# Patient Record
Sex: FEMALE | Race: ASIAN | NOT HISPANIC OR LATINO | URBAN - METROPOLITAN AREA
[De-identification: names, ages, dates, MRNs, and addresses within clinical notes are randomized per-mention and may not be internally consistent; named-entity substitution may affect disease eponyms.]

---

## 2019-08-10 ENCOUNTER — EMERGENCY (EMERGENCY)
Facility: HOSPITAL | Age: 34
LOS: 1 days | Discharge: HOME | End: 2019-08-10
Admitting: EMERGENCY MEDICINE
Payer: COMMERCIAL

## 2019-08-10 VITALS
DIASTOLIC BLOOD PRESSURE: 73 MMHG | TEMPERATURE: 98 F | WEIGHT: 119.93 LBS | OXYGEN SATURATION: 100 % | RESPIRATION RATE: 17 BRPM | HEART RATE: 90 BPM | HEIGHT: 66 IN | SYSTOLIC BLOOD PRESSURE: 106 MMHG

## 2019-08-10 VITALS
DIASTOLIC BLOOD PRESSURE: 72 MMHG | TEMPERATURE: 98 F | HEART RATE: 83 BPM | RESPIRATION RATE: 18 BRPM | SYSTOLIC BLOOD PRESSURE: 105 MMHG | OXYGEN SATURATION: 99 %

## 2019-08-10 PROCEDURE — 99283 EMERGENCY DEPT VISIT LOW MDM: CPT

## 2019-08-10 RX ORDER — ACETAMINOPHEN 500 MG
650 TABLET ORAL ONCE
Refills: 0 | Status: COMPLETED | OUTPATIENT
Start: 2019-08-10 | End: 2019-08-10

## 2019-08-10 RX ADMIN — Medication 650 MILLIGRAM(S): at 02:03

## 2019-08-10 NOTE — ED PROVIDER NOTE - RESPIRATORY [-], MLM
no shortness of breath no shortness of breath/no hemoptysis/no cough/no exertional dyspnea/no orthopnea

## 2019-08-10 NOTE — ED ADULT NURSE NOTE - OBJECTIVE STATEMENT
Pt is a 34y female complaining of head injury s/p syncopal episode yesterday. Pt states that episode occurred when changing positions. Pt states that she hit the back of her head unknown loc. Pt complaining of headache today. Pt denies nausea, vomiting, fever and chills.

## 2019-08-10 NOTE — ED PROVIDER NOTE - MUSCULOSKELETAL, MLM
Spine appears normal, range of motion is not limited, no muscle or joint tenderness; no midline spine tenderness, + contusion to posterior scalp

## 2019-08-10 NOTE — ED PROVIDER NOTE - ENMT, MLM
Airway patent. Airway patent, Nasal mucosa clear. Mouth with normal mucosa. Throat has no vesicles, no oropharyngeal exudates and uvula is midline. No hemotympanum

## 2019-08-10 NOTE — ED PROVIDER NOTE - NEUROLOGICAL, MLM
Alert and oriented, no focal deficits, no motor or sensory deficits. CNs intact. Normal Romberg. Normal finger to nose. No pronator drift. Normal rapi alternating movements/heal to shin. Sensation intact to all extremities. 5/5 strength to all extremities.

## 2019-08-10 NOTE — ED PROVIDER NOTE - CLINICAL SUMMARY MEDICAL DECISION MAKING FREE TEXT BOX
35 y/o Female presents to ED s/p head injury from Thursday night and worsening HA. Will give pt pain relievers. 35 y/o Female presents to ED s/p head injury from Thursday night with headache that is nonprogressive or severe.  pt has not taken anything for pain.  Nonfocal exam and pt well appearing.  Pt with head injury >24 hours ago with nonfocal exam and mild headache, no suspicion at this time for intracranial bleeding or fracture.  Shared decision making used with pt and will not obtain CT scan. pt likely with mild concussion. advised neuro f/u.  tylenol, motrin, Ice for pain

## 2019-08-10 NOTE — ED PROVIDER NOTE - NSFOLLOWUPCLINICS_GEN_ALL_ED_FT
[facility]  Neurology  [address] [place] [address]  Phone: [phone]  Fax: [phone]  Follow Up Time:

## 2019-08-10 NOTE — ED PROVIDER NOTE - OBJECTIVE STATEMENT
33 y/o Female presents to ED c/o head injury. Pt states on Thursday night ~9pm she was squatting and smoking on her balcony and when she stood up she felt dizzy and fell backwards hitting her head. +LOC and nausea. PT states today she had a worsening HA and noticed a bump on the back of her head. Denies vomiting, vision changes, blurred vision, neck pain, SOB, and swelling in legs. 33 y/o Female presents to ED c/o head injury. Pt states on Thursday night ~9pm she was squatting and smoking on her balcony and when she stood up quickly she felt dizzy and fell backwards hitting her head and buttock.  Pt endorses possible LOC for "a couple seconds". PT states today she had a headache which she states is mild and nonprogressive.  She also noticed a bump on the back of her head. Denies vomiting, vision changes, blurred vision, neck pain, SOB, and swelling in legs. Denies any chest pain, palpitations, or sob.  Pt denies taking anything for pain.
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
## 2019-08-15 DIAGNOSIS — S09.90XA UNSPECIFIED INJURY OF HEAD, INITIAL ENCOUNTER: ICD-10-CM
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
